# Patient Record
Sex: FEMALE | Race: WHITE
[De-identification: names, ages, dates, MRNs, and addresses within clinical notes are randomized per-mention and may not be internally consistent; named-entity substitution may affect disease eponyms.]

---

## 2020-02-28 ENCOUNTER — HOSPITAL ENCOUNTER (OUTPATIENT)
Dept: HOSPITAL 76 - DI.WCP | Age: 55
Discharge: HOME | End: 2020-02-28
Attending: FAMILY MEDICINE
Payer: COMMERCIAL

## 2020-02-28 DIAGNOSIS — M16.0: Primary | ICD-10-CM

## 2020-02-28 DIAGNOSIS — M47.898: ICD-10-CM

## 2020-02-28 PROCEDURE — 72170 X-RAY EXAM OF PELVIS: CPT

## 2020-03-01 NOTE — XRAY REPORT
Reason:  HIP PAIN

Procedure Date:  02/28/2020   

Accession Number:  [id number] / [id number]                    

Procedure:  WCP - Pelvis 1 View CPT Code:  

 

***Final Report***

 

 

FULL RESULT:

 

 

EXAM:

PELVIS RADIOGRAPHY

 

EXAM DATE: 2/28/2020 02:28 PM.

 

CLINICAL HISTORY: HIP PAIN.

 

COMPARISON: None.

 

TECHNIQUE: 1 view.

 

FINDINGS:

Bones: No acute fracture or suspicious osseous lesion.

 

Joints: Moderate bilateral hip osteoarthritis. Mild degenerative changes 

of the bilateral sacroiliac joints and pubic symphysis.

 

Soft Tissues: Unremarkable.

IMPRESSION:

1. Moderate bilateral hip osteoarthritis.

 

[facility]

## 2020-09-04 ENCOUNTER — HOSPITAL ENCOUNTER (OUTPATIENT)
Dept: HOSPITAL 76 - DI.N | Age: 55
Discharge: HOME | End: 2020-09-04
Attending: GENERAL ACUTE CARE HOSPITAL
Payer: COMMERCIAL

## 2020-09-04 DIAGNOSIS — R92.8: ICD-10-CM

## 2020-09-04 DIAGNOSIS — Z12.31: Primary | ICD-10-CM

## 2020-09-04 PROCEDURE — 77063 BREAST TOMOSYNTHESIS BI: CPT

## 2020-09-04 PROCEDURE — 77067 SCR MAMMO BI INCL CAD: CPT

## 2020-09-09 NOTE — MAMMOGRAPHY REPORT
BILATERAL DIGITAL SCREENING MAMMOGRAM 3D/2D: 9/4/2020

CLINICAL: Routine screening.  

 

Comparison is made to exam dated:  12/8/2008 mammogram - [facility].  There are sca
ttered fibroglandular elements in both breasts.  

 

There is a 0.9 cm irregular asymmetry in the left breast middle depth central to the nipple seen on t
he craniocaudal view only 6.2 cm from the nipple.  

 

No other significant masses, calcifications, or other findings are seen in either breast.  

 

IMPRESSION: INCOMPLETE: NEEDS ADDITIONAL IMAGING EVALUATION

Approximately 0.9 cm irregular asymmetry in the left breast is indeterminate.  Diagnostic mammogram a
nd ultrasound are both recommended.  

 

 

This exam was interpreted at Station ID: [id number].  

 

NOTE: For mammograms, a report in lay terms will be sent to the patient. Approximately 15% of breast 
malignancies will not be visualized mammographically. In the management of a palpable breast mass, a 
negative mammogram must not discourage biopsy of a clinically suspicious lesion.

 

Electronically Signed By: Huber Corona M.D.          

jr/:9/9/2020 08:39:34  

 

 

 

ACR BI-RADS Category 0: Incomplete 3340F

PARENCHYMAL PATTERN: (A) - The breast(s) demonstrate(s) scattered fibroglandular densities.

BI-RADS CATEGORY: (0) - 0

Mammo and US

[id number]

Immediate follow-up

LATERALITY: (B)

## 2020-11-04 ENCOUNTER — HOSPITAL ENCOUNTER (OUTPATIENT)
Dept: HOSPITAL 76 - DI | Age: 55
Discharge: HOME | End: 2020-11-04
Attending: FAMILY MEDICINE
Payer: COMMERCIAL

## 2020-11-04 DIAGNOSIS — R92.8: Primary | ICD-10-CM

## 2020-11-04 PROCEDURE — 76642 ULTRASOUND BREAST LIMITED: CPT

## 2020-11-05 NOTE — MAMMOGRAPHY REPORT
UNILATERAL LEFT DIGITAL DIAGNOSTIC MAMMOGRAM 3D/2D: 11/4/2020

CLINICAL: Patient returns today to evaluate a focal asymmetry in the left breast.  

 

Comparison is made to exams dated:  9/4/2020 mammogram and 12/8/2008 mammogram - [facility].  There are scattered fibroglandular elements in left breast.  

 

There is an irregular asymmetry in the left breast middle depth central to the nipple seen on the cra
niocaudal view only.  This is less prominent and is not seen on additional views. 

 

No other significant masses or calcifications are seen in the breast.  

 

IMPRESSION: INCOMPLETE: NEEDS ADDITIONAL IMAGING EVALUATION

The irregular asymmetry in the left breast resembles fibroglandular tissue.  

 

A targeted ultrasound is recommended and will immediately follow.  

 

 

This exam was interpreted at Station ID: [id number].  

 

NOTE: For mammograms, a report in lay terms will be sent to the patient. Approximately 15% of breast 
malignancies will not be visualized mammographically. In the management of a palpable breast mass, a 
negative mammogram must not discourage biopsy of a clinically suspicious lesion.

 

Electronically Signed By: Julien Quintero M.D.          

slc/:11/4/2020 11:09:36  

 

 

 

ACR BI-RADS Category 0: Incomplete 3340F

PARENCHYMAL PATTERN: (A) - The breast(s) demonstrate(s) scattered fibroglandular densities.

BI-RADS CATEGORY: (0) - 0

Ultrasound

[id number]

Immediate follow-up

LATERALITY: (B)